# Patient Record
Sex: FEMALE | Race: OTHER
[De-identification: names, ages, dates, MRNs, and addresses within clinical notes are randomized per-mention and may not be internally consistent; named-entity substitution may affect disease eponyms.]

---

## 2023-07-27 ENCOUNTER — APPOINTMENT (OUTPATIENT)
Dept: OTOLARYNGOLOGY | Facility: CLINIC | Age: 33
End: 2023-07-27
Payer: COMMERCIAL

## 2023-07-27 DIAGNOSIS — J31.0 CHRONIC RHINITIS: ICD-10-CM

## 2023-07-27 DIAGNOSIS — Z78.9 OTHER SPECIFIED HEALTH STATUS: ICD-10-CM

## 2023-07-27 DIAGNOSIS — Z83.3 FAMILY HISTORY OF DIABETES MELLITUS: ICD-10-CM

## 2023-07-27 DIAGNOSIS — Z86.79 PERSONAL HISTORY OF OTHER DISEASES OF THE CIRCULATORY SYSTEM: ICD-10-CM

## 2023-07-27 DIAGNOSIS — Z86.39 PERSONAL HISTORY OF OTHER ENDOCRINE, NUTRITIONAL AND METABOLIC DISEASE: ICD-10-CM

## 2023-07-27 DIAGNOSIS — Z91.09 OTHER ALLERGY STATUS, OTHER THAN TO DRUGS AND BIOLOGICAL SUBSTANCES: ICD-10-CM

## 2023-07-27 DIAGNOSIS — H61.22 IMPACTED CERUMEN, LEFT EAR: ICD-10-CM

## 2023-07-27 DIAGNOSIS — Z82.49 FAMILY HISTORY OF ISCHEMIC HEART DISEASE AND OTHER DISEASES OF THE CIRCULATORY SYSTEM: ICD-10-CM

## 2023-07-27 DIAGNOSIS — J02.9 ACUTE PHARYNGITIS, UNSPECIFIED: ICD-10-CM

## 2023-07-27 PROBLEM — Z00.00 ENCOUNTER FOR PREVENTIVE HEALTH EXAMINATION: Status: ACTIVE | Noted: 2023-07-27

## 2023-07-27 PROCEDURE — 99203 OFFICE O/P NEW LOW 30 MIN: CPT

## 2023-07-28 PROBLEM — Z86.79 HISTORY OF HYPERTENSION: Status: RESOLVED | Noted: 2023-07-27 | Resolved: 2023-07-28

## 2023-07-28 PROBLEM — Z78.9 CAFFEINE USE: Status: ACTIVE | Noted: 2023-07-27

## 2023-07-28 PROBLEM — Z86.39 HISTORY OF DIABETES MELLITUS: Status: RESOLVED | Noted: 2023-07-27 | Resolved: 2023-07-28

## 2023-07-28 PROBLEM — Z82.49 FAMILY HISTORY OF CARDIAC DISORDER: Status: ACTIVE | Noted: 2023-07-27

## 2023-07-28 PROBLEM — Z83.3 FAMILY HISTORY OF DIABETES MELLITUS: Status: ACTIVE | Noted: 2023-07-27

## 2023-07-28 RX ORDER — AMLODIPINE AND ATORVASTATIN 10; 10 MG/1; MG/1
10-10 TABLET, COATED ORAL
Refills: 0 | Status: ACTIVE | COMMUNITY

## 2023-07-28 RX ORDER — SEMAGLUTIDE 2.68 MG/ML
8 INJECTION, SOLUTION SUBCUTANEOUS
Refills: 0 | Status: ACTIVE | COMMUNITY

## 2023-07-28 RX ORDER — ESCITALOPRAM OXALATE 10 MG/1
10 TABLET, FILM COATED ORAL
Refills: 0 | Status: ACTIVE | COMMUNITY

## 2023-07-28 NOTE — HISTORY OF PRESENT ILLNESS
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  She has mild odynophagia, nasal congestion, and sneezing.  She may have an upper respiratory tract infection.  She had COVID testing was negative.  She has no sinus pain or pressure.  She has no tinnitus, dizziness, or otorrhea.\par \par She has had allergy type symptoms for 1 month.  She had testing in the past.\par She does not get many sinus infections\par She has no pets at home \par she does not smoke tobacco or marijuana\par She has no known history of reflux \par she uses nasal steroid spray and antihistamine

## 2023-07-28 NOTE — ASSESSMENT
[FreeTextEntry1] : She has 1 week history of nasal and throat symptoms which may be due to an upper respiratory tract infection or allergy exacerbation.  I sent a culture to rule out bacterial infection.  On exam, she had mild nasal mucosal edema and reflux related laryngeal changes.  She also had cerumen impaction on the left which was removed.\par \par Plan\par -Findings and management options were discussed with the patient.\par -Nasal saline irrigations, nasal steroid spray, and antihistamine\par -I recommended reflux precautions.  She may take OTC medication as needed\par -Salt water gargles as needed.  Avoid alcohol based mouthwashes\par -She was asked to call for the culture results.  If it is positive, I will place her on antibiotics\par -Good ear hygiene reviewed\par -Consider audiogram when she returns\par -Follow-up in 2 weeks\par -Call and return earlier for any concerns or worsening symptoms\par

## 2023-07-28 NOTE — REASON FOR VISIT
[Initial Evaluation] : an initial evaluation for [FreeTextEntry2] : Nasal congestion and left eustachian tube discomfort

## 2023-07-31 LAB — BACTERIA THROAT CULT: ABNORMAL

## 2023-07-31 RX ORDER — AMOXICILLIN AND CLAVULANATE POTASSIUM 875; 125 MG/1; MG/1
875-125 TABLET, COATED ORAL
Qty: 14 | Refills: 0 | Status: ACTIVE | COMMUNITY
Start: 2023-07-31 | End: 1900-01-01

## 2023-08-11 ENCOUNTER — APPOINTMENT (OUTPATIENT)
Dept: OTOLARYNGOLOGY | Facility: CLINIC | Age: 33
End: 2023-08-11